# Patient Record
Sex: MALE | Race: WHITE | URBAN - METROPOLITAN AREA
[De-identification: names, ages, dates, MRNs, and addresses within clinical notes are randomized per-mention and may not be internally consistent; named-entity substitution may affect disease eponyms.]

---

## 2023-03-22 ENCOUNTER — OFFICE VISIT (OUTPATIENT)
Dept: PRIMARY CARE | Facility: CLINIC | Age: 78
End: 2023-03-22
Payer: MEDICARE

## 2023-03-22 VITALS
RESPIRATION RATE: 12 BRPM | WEIGHT: 174 LBS | BODY MASS INDEX: 25.77 KG/M2 | HEIGHT: 69 IN | HEART RATE: 70 BPM | SYSTOLIC BLOOD PRESSURE: 128 MMHG | OXYGEN SATURATION: 98 % | DIASTOLIC BLOOD PRESSURE: 70 MMHG

## 2023-03-22 DIAGNOSIS — E78.5 DYSLIPIDEMIA: ICD-10-CM

## 2023-03-22 DIAGNOSIS — M19.90 OSTEOARTHRITIS, UNSPECIFIED OSTEOARTHRITIS TYPE, UNSPECIFIED SITE: Primary | ICD-10-CM

## 2023-03-22 DIAGNOSIS — R97.20 ELEVATED PSA: ICD-10-CM

## 2023-03-22 PROBLEM — R11.0 NAUSEA IN ADULT: Status: ACTIVE | Noted: 2023-03-22

## 2023-03-22 PROBLEM — R10.9 ABDOMINAL PAIN: Status: ACTIVE | Noted: 2023-03-22

## 2023-03-22 PROCEDURE — 1036F TOBACCO NON-USER: CPT | Performed by: INTERNAL MEDICINE

## 2023-03-22 PROCEDURE — 99214 OFFICE O/P EST MOD 30 MIN: CPT | Performed by: INTERNAL MEDICINE

## 2023-03-22 NOTE — ASSESSMENT & PLAN NOTE
Likely enlarged prostate.  Discussed with patient importance of urology consult. Patient denied this at this time. Will continue to monitor and order blood work in 3 months.

## 2023-03-22 NOTE — PROGRESS NOTES
"Subjective   Chief complaint: Surinder Houston is a 78 y.o. male who presents for Knee Pain (Pt states he drove from [place] to [place] and is now having some left leg/knee pain for the last 8 days. He is take motrin helps him some. ).    HPI:  Pain is here for knee pain. He has been taking motrin for the pain.       Objective   /70   Pulse 70   Resp 12   Ht 1.753 m (5' 9\")   Wt 78.9 kg (174 lb)   SpO2 98%   BMI 25.70 kg/m²   Physical Exam  Vitals reviewed.   Constitutional:       Appearance: Normal appearance.   HENT:      Head: Normocephalic and atraumatic.   Cardiovascular:      Rate and Rhythm: Normal rate and regular rhythm.   Pulmonary:      Effort: Pulmonary effort is normal.      Breath sounds: Normal breath sounds.   Abdominal:      General: Bowel sounds are normal.      Palpations: Abdomen is soft.   Musculoskeletal:      Cervical back: Neck supple.      Comments: Tender on medial aspect of knee   Skin:     General: Skin is warm and dry.   Neurological:      General: No focal deficit present.      Mental Status: He is alert.   Psychiatric:         Mood and Affect: Mood normal.         Behavior: Behavior is cooperative.         I have reviewed and reconciled the medication list with the patient today. No current outpatient medications on file.     Imaging:  No results found.     Labs reviewed:    Lab Results   Component Value Date    WBC 6.9 01/09/2023    HGB 16.1 01/09/2023    HCT 51.8 01/09/2023     01/09/2023    CHOL 210 (H) 01/09/2023    TRIG 97 01/09/2023    HDL 46.4 01/09/2023    ALT 17 01/09/2023    AST 17 01/09/2023     01/09/2023    K 4.1 01/09/2023     01/09/2023    CREATININE 0.95 01/09/2023    BUN 17 01/09/2023    CO2 28 01/09/2023    TSH 0.68 01/09/2023    HGBA1C 5.3 11/30/2021       Assessment/Plan   Problem List Items Addressed This Visit          Genitourinary    Elevated PSA     Likely enlarged prostate.  Discussed with patient importance of urology consult. " Patient denied this at this time. Will continue to monitor and order blood work in 3 months.             Musculoskeletal    DJD (degenerative joint disease) - Primary     Voltaren gel to area 3x a day - left knee pain, likely meniscal tear              Other    Dyslipidemia     Recommend flax seed capsules with every meal.  Recommend healthy diet with low-calorie, low-fat.            Continue current medications as listed  Follow up in 3 months for PSA & lipids

## 2023-04-04 DIAGNOSIS — M25.562 LEFT KNEE PAIN, UNSPECIFIED CHRONICITY: ICD-10-CM

## 2023-04-27 ENCOUNTER — TELEMEDICINE (OUTPATIENT)
Dept: PRIMARY CARE | Facility: CLINIC | Age: 78
End: 2023-04-27
Payer: MEDICARE

## 2023-04-27 DIAGNOSIS — E78.5 DYSLIPIDEMIA: ICD-10-CM

## 2023-04-27 DIAGNOSIS — R05.9 COUGH, UNSPECIFIED TYPE: ICD-10-CM

## 2023-04-27 DIAGNOSIS — E55.9 VITAMIN D DEFICIENCY: ICD-10-CM

## 2023-04-27 DIAGNOSIS — M19.90 OSTEOARTHRITIS, UNSPECIFIED OSTEOARTHRITIS TYPE, UNSPECIFIED SITE: ICD-10-CM

## 2023-04-27 DIAGNOSIS — R97.20 ELEVATED PSA: ICD-10-CM

## 2023-04-27 DIAGNOSIS — I10 ESSENTIAL HYPERTENSION: ICD-10-CM

## 2023-04-27 DIAGNOSIS — J20.9 ACUTE BRONCHITIS, UNSPECIFIED ORGANISM: Primary | ICD-10-CM

## 2023-04-27 PROBLEM — N40.1 BENIGN PROSTATIC HYPERPLASIA WITH LOWER URINARY TRACT SYMPTOMS: Status: ACTIVE | Noted: 2019-11-25

## 2023-04-27 PROCEDURE — 99214 OFFICE O/P EST MOD 30 MIN: CPT | Performed by: INTERNAL MEDICINE

## 2023-04-27 RX ORDER — SULFAMETHOXAZOLE AND TRIMETHOPRIM 800; 160 MG/1; MG/1
1 TABLET ORAL 2 TIMES DAILY
Qty: 14 TABLET | Refills: 0 | Status: SHIPPED | OUTPATIENT
Start: 2023-04-27 | End: 2023-05-04

## 2023-04-27 NOTE — PROGRESS NOTES
Subjective   Chief complaint: Surinder Landry is a 78 y.o. male who presents for Cough (Pt c/o coughing,yellow mucus for 10 days. ).    HPI:  Cough productive yellow sputum for 7 days not improving with congestion in the chest and wheezing.        Objective   There were no vitals taken for this visit.  Physical Exam  Vitals reviewed.   Constitutional:       Appearance: Normal appearance.   HENT:      Head: Normocephalic and atraumatic.   Cardiovascular:      Rate and Rhythm: Normal rate and regular rhythm.   Pulmonary:      Effort: Pulmonary effort is normal.      Breath sounds: Normal breath sounds.      Comments: Audible rhonchi both lung  Abdominal:      General: Bowel sounds are normal.      Palpations: Abdomen is soft.   Musculoskeletal:      Cervical back: Neck supple.   Skin:     General: Skin is warm and dry.   Neurological:      General: No focal deficit present.      Mental Status: He is alert.   Psychiatric:         Mood and Affect: Mood normal.         Behavior: Behavior is cooperative.         I have reviewed and reconciled the medication list with the patient today. No current outpatient medications on file.     Imaging:  XR knee left 1-2 views    Result Date: 4/5/2023  Interpreted By:  SULLY HI MD MRN: [id number] Patient Name: SURINDER LANDRY  STUDY: KNEE; 3 VIEWS;  4/4/2023 2:35 pm  INDICATION: knee pain.  COMPARISON: None.  ACCESSION NUMBER(S): [id number]  ORDERING CLINICIAN: ANGELICA MAIN  FINDINGS: Left knee three views  There is no fracture. There is no dislocation. There is moderate joint space narrowing with osteophytosis in the medial patellofemoral compartments.. There is no joint effusion.      Moderate medial and patellofemoral compartment osteoarthritis       Labs reviewed:    Lab Results   Component Value Date    WBC 6.9 01/09/2023    HGB 16.1 01/09/2023    HCT 51.8 01/09/2023     01/09/2023    CHOL 210 (H) 01/09/2023    TRIG 97 01/09/2023    HDL 46.4 01/09/2023    ALT 17  01/09/2023    AST 17 01/09/2023     01/09/2023    K 4.1 01/09/2023     01/09/2023    CREATININE 0.95 01/09/2023    BUN 17 01/09/2023    CO2 28 01/09/2023    TSH 0.68 01/09/2023    HGBA1C 5.3 11/30/2021       Assessment/Plan   Problem List Items Addressed This Visit    None      Continue current medications as listed  Follow up in acute bronchitis start Bactrim DS for 7 days follow-up in 7 days if not better

## 2023-06-06 DIAGNOSIS — M25.562 CHRONIC PAIN OF LEFT KNEE: ICD-10-CM

## 2023-06-06 DIAGNOSIS — G89.29 CHRONIC PAIN OF LEFT KNEE: ICD-10-CM

## 2023-11-22 ENCOUNTER — ANCILLARY PROCEDURE (OUTPATIENT)
Dept: RADIOLOGY | Facility: CLINIC | Age: 78
End: 2023-11-22
Payer: MEDICARE

## 2023-11-22 DIAGNOSIS — M71.22 SYNOVIAL CYST OF POPLITEAL SPACE (BAKER), LEFT KNEE: ICD-10-CM

## 2023-11-22 PROCEDURE — 76881 US COMPL JOINT R-T W/IMG: CPT

## 2023-11-22 PROCEDURE — 76882 US LMTD JT/FCL EVL NVASC XTR: CPT | Performed by: RADIOLOGY

## 2024-05-21 ENCOUNTER — OFFICE VISIT (OUTPATIENT)
Dept: PRIMARY CARE | Facility: CLINIC | Age: 79
End: 2024-05-21
Payer: MEDICARE

## 2024-05-21 VITALS
HEIGHT: 69 IN | DIASTOLIC BLOOD PRESSURE: 84 MMHG | BODY MASS INDEX: 26.07 KG/M2 | SYSTOLIC BLOOD PRESSURE: 167 MMHG | RESPIRATION RATE: 12 BRPM | OXYGEN SATURATION: 93 % | HEART RATE: 78 BPM | WEIGHT: 176 LBS

## 2024-05-21 DIAGNOSIS — M25.562 ACUTE PAIN OF LEFT KNEE: ICD-10-CM

## 2024-05-21 DIAGNOSIS — M19.90 OSTEOARTHRITIS, UNSPECIFIED OSTEOARTHRITIS TYPE, UNSPECIFIED SITE: Primary | ICD-10-CM

## 2024-05-21 PROBLEM — N52.9 ED (ERECTILE DYSFUNCTION) OF ORGANIC ORIGIN: Status: ACTIVE | Noted: 2024-05-21

## 2024-05-21 PROBLEM — L21.9 SEBORRHEIC DERMATITIS OF SCALP: Status: ACTIVE | Noted: 2024-05-21

## 2024-05-21 PROCEDURE — 3077F SYST BP >= 140 MM HG: CPT | Performed by: INTERNAL MEDICINE

## 2024-05-21 PROCEDURE — 99213 OFFICE O/P EST LOW 20 MIN: CPT | Performed by: INTERNAL MEDICINE

## 2024-05-21 PROCEDURE — 1159F MED LIST DOCD IN RCRD: CPT | Performed by: INTERNAL MEDICINE

## 2024-05-21 PROCEDURE — 3079F DIAST BP 80-89 MM HG: CPT | Performed by: INTERNAL MEDICINE

## 2024-05-21 PROCEDURE — 1036F TOBACCO NON-USER: CPT | Performed by: INTERNAL MEDICINE

## 2024-05-21 RX ORDER — CELECOXIB 200 MG/1
200 CAPSULE ORAL DAILY
Qty: 90 CAPSULE | Refills: 3 | Status: SHIPPED | OUTPATIENT
Start: 2024-05-21

## 2024-05-21 NOTE — PROGRESS NOTES
"Subjective   Chief complaint: Surinder Houston is a 79 y.o. male who presents for Knee Pain (Pt is here for left knee pain ).    HPI:  Patient is 79 years old white male who presented to me with severe pain in his left knee off and on and patient was seen by to orthopedic one of them told him that he needed total knee replacement the other 1 given a steroid injection      1    Knee Pain         Objective   /84   Pulse 78   Resp 12   Ht 1.753 m (5' 9\")   Wt 79.8 kg (176 lb)   SpO2 93%   BMI 25.99 kg/m²   Physical Exam  Vitals reviewed.   Constitutional:       Appearance: Normal appearance.   HENT:      Head: Normocephalic and atraumatic.   Cardiovascular:      Rate and Rhythm: Normal rate and regular rhythm.   Pulmonary:      Effort: Pulmonary effort is normal.      Breath sounds: Normal breath sounds.   Abdominal:      General: Bowel sounds are normal.      Palpations: Abdomen is soft.   Musculoskeletal:      Cervical back: Neck supple.      Left knee: Swelling present. Decreased range of motion. Tenderness present.   Skin:     General: Skin is warm and dry.   Neurological:      General: No focal deficit present.      Mental Status: He is alert.   Psychiatric:         Mood and Affect: Mood normal.         Behavior: Behavior is cooperative.         I have reviewed and reconciled the medication list with the patient today. No current outpatient medications on file.     Imaging:  No results found.     Labs reviewed:    Lab Results   Component Value Date    WBC 6.9 01/09/2023    HGB 16.1 01/09/2023    HCT 51.8 01/09/2023     01/09/2023    CHOL 210 (H) 01/09/2023    TRIG 97 01/09/2023    HDL 46.4 01/09/2023    ALT 17 01/09/2023    AST 17 01/09/2023     01/09/2023    K 4.1 01/09/2023     01/09/2023    CREATININE 0.95 01/09/2023    BUN 17 01/09/2023    CO2 28 01/09/2023    TSH 0.68 01/09/2023    HGBA1C 5.3 11/30/2021       Assessment/Plan   Problem List Items Addressed This Visit    None  Visit " Diagnoses       Acute pain of left knee                Continue current medications as listed  Follow up in 1 months for complete physical

## 2024-06-27 ENCOUNTER — APPOINTMENT (OUTPATIENT)
Dept: PRIMARY CARE | Facility: CLINIC | Age: 79
End: 2024-06-27
Payer: MEDICARE

## 2024-06-27 DIAGNOSIS — Z12.5 SCREENING PSA (PROSTATE SPECIFIC ANTIGEN): ICD-10-CM

## 2024-06-27 DIAGNOSIS — D50.9 IRON DEFICIENCY ANEMIA, UNSPECIFIED IRON DEFICIENCY ANEMIA TYPE: ICD-10-CM

## 2024-06-27 DIAGNOSIS — I10 BENIGN ESSENTIAL HYPERTENSION: ICD-10-CM

## 2024-06-27 DIAGNOSIS — I10 ESSENTIAL HYPERTENSION: ICD-10-CM

## 2024-06-27 DIAGNOSIS — E55.9 VITAMIN D DEFICIENCY: ICD-10-CM

## 2024-06-27 DIAGNOSIS — E78.5 HYPERLIPIDEMIA, UNSPECIFIED HYPERLIPIDEMIA TYPE: ICD-10-CM

## 2024-06-27 DIAGNOSIS — E78.00 ELEVATED LDL CHOLESTEROL LEVEL: ICD-10-CM

## 2024-06-27 DIAGNOSIS — E03.9 HYPOTHYROIDISM, UNSPECIFIED TYPE: ICD-10-CM

## 2024-06-27 DIAGNOSIS — Z00.00 ENCOUNTER FOR ANNUAL WELLNESS VISIT (AWV) IN MEDICARE PATIENT: ICD-10-CM

## 2024-06-27 DIAGNOSIS — R97.20 ELEVATED PSA: ICD-10-CM

## 2024-06-27 DIAGNOSIS — I10 PRIMARY HYPERTENSION: ICD-10-CM

## 2024-06-27 LAB
25(OH)D3 SERPL-MCNC: 48 NG/ML (ref 30–100)
ALBUMIN SERPL BCP-MCNC: 4.2 G/DL (ref 3.4–5)
ALP SERPL-CCNC: 57 U/L (ref 33–136)
ALT SERPL W P-5'-P-CCNC: 21 U/L (ref 10–52)
ANION GAP SERPL CALC-SCNC: 15 MMOL/L (ref 10–20)
AST SERPL W P-5'-P-CCNC: 21 U/L (ref 9–39)
BILIRUB SERPL-MCNC: 0.8 MG/DL (ref 0–1.2)
BUN SERPL-MCNC: 22 MG/DL (ref 6–23)
CALCIUM SERPL-MCNC: 9.3 MG/DL (ref 8.6–10.6)
CHLORIDE SERPL-SCNC: 103 MMOL/L (ref 98–107)
CHOLEST SERPL-MCNC: 211 MG/DL (ref 0–199)
CHOLESTEROL/HDL RATIO: 4.2
CO2 SERPL-SCNC: 25 MMOL/L (ref 21–32)
CREAT SERPL-MCNC: 1 MG/DL (ref 0.5–1.3)
EGFRCR SERPLBLD CKD-EPI 2021: 78 ML/MIN/1.73M*2
GLUCOSE SERPL-MCNC: 108 MG/DL (ref 74–99)
HDLC SERPL-MCNC: 50.4 MG/DL
LDLC SERPL CALC-MCNC: 137 MG/DL
NON HDL CHOLESTEROL: 161 MG/DL (ref 0–149)
POTASSIUM SERPL-SCNC: 4.2 MMOL/L (ref 3.5–5.3)
PROT SERPL-MCNC: 6.6 G/DL (ref 6.4–8.2)
PSA SERPL-MCNC: 4.76 NG/ML
SODIUM SERPL-SCNC: 139 MMOL/L (ref 136–145)
TRIGL SERPL-MCNC: 116 MG/DL (ref 0–149)
TSH SERPL-ACNC: 0.59 MIU/L (ref 0.44–3.98)
VLDL: 23 MG/DL (ref 0–40)

## 2024-06-27 PROCEDURE — 80061 LIPID PANEL: CPT

## 2024-06-27 PROCEDURE — 84443 ASSAY THYROID STIM HORMONE: CPT

## 2024-06-27 PROCEDURE — 82306 VITAMIN D 25 HYDROXY: CPT

## 2024-06-27 PROCEDURE — G0103 PSA SCREENING: HCPCS

## 2024-06-27 PROCEDURE — 36415 COLL VENOUS BLD VENIPUNCTURE: CPT

## 2024-06-27 PROCEDURE — 80053 COMPREHEN METABOLIC PANEL: CPT

## 2024-06-27 PROCEDURE — 85027 COMPLETE CBC AUTOMATED: CPT

## 2024-06-28 LAB
ERYTHROCYTE [DISTWIDTH] IN BLOOD BY AUTOMATED COUNT: 14.6 % (ref 11.5–14.5)
HCT VFR BLD AUTO: 54.5 % (ref 41–52)
HGB BLD-MCNC: 17.1 G/DL (ref 13.5–17.5)
MCH RBC QN AUTO: 29.3 PG (ref 26–34)
MCHC RBC AUTO-ENTMCNC: 31.4 G/DL (ref 32–36)
MCV RBC AUTO: 94 FL (ref 80–100)
NRBC BLD-RTO: 0 /100 WBCS (ref 0–0)
PLATELET # BLD AUTO: 222 X10*3/UL (ref 150–450)
RBC # BLD AUTO: 5.83 X10*6/UL (ref 4.5–5.9)
WBC # BLD AUTO: 7.5 X10*3/UL (ref 4.4–11.3)

## 2024-07-01 ENCOUNTER — APPOINTMENT (OUTPATIENT)
Dept: PRIMARY CARE | Facility: CLINIC | Age: 79
End: 2024-07-01
Payer: MEDICARE

## 2024-07-05 ENCOUNTER — OFFICE VISIT (OUTPATIENT)
Dept: PRIMARY CARE | Facility: CLINIC | Age: 79
End: 2024-07-05
Payer: MEDICARE

## 2024-07-05 VITALS
SYSTOLIC BLOOD PRESSURE: 148 MMHG | WEIGHT: 173 LBS | OXYGEN SATURATION: 94 % | RESPIRATION RATE: 16 BRPM | HEART RATE: 106 BPM | BODY MASS INDEX: 25.55 KG/M2 | DIASTOLIC BLOOD PRESSURE: 78 MMHG

## 2024-07-05 DIAGNOSIS — R35.0 BENIGN PROSTATIC HYPERPLASIA WITH URINARY FREQUENCY: ICD-10-CM

## 2024-07-05 DIAGNOSIS — R73.9 HYPERGLYCEMIA: ICD-10-CM

## 2024-07-05 DIAGNOSIS — I10 ESSENTIAL HYPERTENSION: Primary | ICD-10-CM

## 2024-07-05 DIAGNOSIS — N40.1 BENIGN PROSTATIC HYPERPLASIA WITH URINARY FREQUENCY: ICD-10-CM

## 2024-07-05 DIAGNOSIS — Z00.00 MEDICARE ANNUAL WELLNESS VISIT, SUBSEQUENT: ICD-10-CM

## 2024-07-05 DIAGNOSIS — E78.5 HYPERLIPIDEMIA, UNSPECIFIED HYPERLIPIDEMIA TYPE: ICD-10-CM

## 2024-07-05 DIAGNOSIS — M19.90 OSTEOARTHRITIS, UNSPECIFIED OSTEOARTHRITIS TYPE, UNSPECIFIED SITE: ICD-10-CM

## 2024-07-05 ASSESSMENT — PAIN SCALES - GENERAL: PAINLEVEL: 4

## 2024-07-05 ASSESSMENT — ENCOUNTER SYMPTOMS
LOSS OF SENSATION IN FEET: 0
DEPRESSION: 0
OCCASIONAL FEELINGS OF UNSTEADINESS: 0

## 2024-07-05 NOTE — PROGRESS NOTES
ANNUAL WELLNESS VISIT    Subjective :  Chief Complaint: Surinder Houston is an 79 y.o. male here for an annual wellness visit and general medical care and f/u.     HPI:  Annual physical    Knee Pain         Objective   /78   Pulse 106   Resp 16   Wt 78.5 kg (173 lb)   SpO2 94%   BMI 25.55 kg/m²     Physical Exam  Vitals reviewed.   Constitutional:       Appearance: Normal appearance.   HENT:      Head: Normocephalic and atraumatic.   Cardiovascular:      Rate and Rhythm: Normal rate and regular rhythm.   Pulmonary:      Effort: Pulmonary effort is normal.      Breath sounds: Normal breath sounds.   Abdominal:      General: Bowel sounds are normal.      Palpations: Abdomen is soft.   Musculoskeletal:      Cervical back: Neck supple.   Skin:     General: Skin is warm and dry.   Neurological:      General: No focal deficit present.      Mental Status: He is alert.   Psychiatric:         Mood and Affect: Mood normal.         Behavior: Behavior is cooperative.         Imaging:  No results found.     Labs reviewed:    Lab Results   Component Value Date    WBC 7.5 06/27/2024    HGB 17.1 06/27/2024    HCT 54.5 (H) 06/27/2024     06/27/2024    CHOL 211 (H) 06/27/2024    TRIG 116 06/27/2024    HDL 50.4 06/27/2024    ALT 21 06/27/2024    AST 21 06/27/2024     06/27/2024    K 4.2 06/27/2024     06/27/2024    CREATININE 1.00 06/27/2024    BUN 22 06/27/2024    CO2 25 06/27/2024    TSH 0.59 06/27/2024    HGBA1C 5.3 11/30/2021       Past Medical, Surgical, and Family History reviewed and updated in chart.    I have reviewed and reconciled the medication list with the patient today.   Current Outpatient Medications:     celecoxib (CeleBREX) 200 mg capsule, Take 1 capsule (200 mg) by mouth once daily., Disp: 90 capsule, Rfl: 3     List of current healthcare providers:  Patient Care Team:  Kassandra Melendez MD as PCP - General  Kassandra Melendez MD as PCP - [employer] PCP     HRA:       Sayra  Fall Risk  One or more falls in the last year? No  How many Times?    Was the patient injured in the fall?    Has trouble stepping onto curb? No  Advised to use a cane or walker to get around safely? No  Often has to rush to toilet? No  Feels unsteady when walking? No  Has lost some feeling in feet? No  Often feels sad or depressed? No  Steadies self on furniture while walking at home? No  Takes medicine that makes them feel lightheaded or more tired than usual? No  Worried about Falling? No  Takes medicine to sleep or improve mood? No  Needs to push with hands when rising from a chair? No                                          Assessment/Plan :  Problem List Items Addressed This Visit       DJD (degenerative joint disease)     Celebrex 200 mg daily           Benign prostatic hyperplasia with lower urinary tract symptoms     Patient refused treatment or referral         Essential hypertension - Primary     Continue blood pressure meds as listed         Hyperlipidemia     Low-fat diet flaxseed         Medicare annual wellness visit, subsequent    Relevant Orders    ECG 12 lead (Clinic Performed) (Completed)    Hyperglycemia     Instruction low-carb diet          The following health maintenance schedule was reviewed with the patient and provided in printed form in the after visit summary:  Health Maintenance   Topic Date Due    Hepatitis C Screening  Never done    RSV Pregnant patients and/or  patients aged 60+ years (1 - 1-dose 60+ series) Never done    DTaP/Tdap/Td Vaccines (1 - Tdap) 06/26/2009    Pneumococcal Vaccine: 65+ Years (1 of 1 - PCV) Never done    Zoster Vaccines (2 of 3) 09/20/2016    COVID-19 Vaccine (1 - 2023-24 season) Never done    Medicare Annual Wellness Visit (AWV)  02/01/2024    Influenza Vaccine (1) 09/01/2024    TSH Level  06/27/2025    Lipid Panel  06/27/2029    HIB Vaccines  Aged Out    Hepatitis B Vaccines  Aged Out    IPV Vaccines  Aged Out    Hepatitis A Vaccines  Aged Out     Meningococcal Vaccine  Aged Out    Rotavirus Vaccines  Aged Out    HPV Vaccines  Aged Out       Advance Care Planning                Orders Placed This Encounter   Procedures    ECG 12 lead (Clinic Performed)       Continue current medications as listed  Follow up in 1 year   Patient refused urinalysis and stool refused colonoscopy